# Patient Record
Sex: MALE | Race: WHITE | Employment: FULL TIME | URBAN - METROPOLITAN AREA
[De-identification: names, ages, dates, MRNs, and addresses within clinical notes are randomized per-mention and may not be internally consistent; named-entity substitution may affect disease eponyms.]

---

## 2022-03-23 ENCOUNTER — APPOINTMENT (OUTPATIENT)
Dept: GENERAL RADIOLOGY | Age: 22
End: 2022-03-23
Attending: EMERGENCY MEDICINE
Payer: COMMERCIAL

## 2022-03-23 ENCOUNTER — HOSPITAL ENCOUNTER (EMERGENCY)
Age: 22
Discharge: HOME | End: 2022-03-23
Attending: EMERGENCY MEDICINE
Payer: COMMERCIAL

## 2022-03-23 VITALS
HEART RATE: 98 BPM | DIASTOLIC BLOOD PRESSURE: 74 MMHG | HEIGHT: 69 IN | OXYGEN SATURATION: 99 % | WEIGHT: 165 LBS | BODY MASS INDEX: 24.44 KG/M2 | RESPIRATION RATE: 18 BRPM | SYSTOLIC BLOOD PRESSURE: 152 MMHG | TEMPERATURE: 98.4 F

## 2022-03-23 DIAGNOSIS — S50.851A FOREIGN BODY IN RIGHT FOREARM, INITIAL ENCOUNTER: Primary | ICD-10-CM

## 2022-03-23 DIAGNOSIS — F11.90 HEROIN USE: ICD-10-CM

## 2022-03-23 PROCEDURE — 99284 EMERGENCY DEPT VISIT MOD MDM: CPT

## 2022-03-23 PROCEDURE — 73090 X-RAY EXAM OF FOREARM: CPT

## 2022-03-23 PROCEDURE — 74011250637 HC RX REV CODE- 250/637: Performed by: PHYSICIAN ASSISTANT

## 2022-03-23 PROCEDURE — 90714 TD VACC NO PRESV 7 YRS+ IM: CPT | Performed by: PHYSICIAN ASSISTANT

## 2022-03-23 PROCEDURE — 74011250636 HC RX REV CODE- 250/636: Performed by: PHYSICIAN ASSISTANT

## 2022-03-23 PROCEDURE — 90471 IMMUNIZATION ADMIN: CPT

## 2022-03-23 RX ORDER — DOXYCYCLINE HYCLATE 100 MG
100 TABLET ORAL 2 TIMES DAILY
Qty: 14 TABLET | Refills: 0 | Status: SHIPPED | OUTPATIENT
Start: 2022-03-23 | End: 2022-03-30

## 2022-03-23 RX ORDER — DOXYCYCLINE 100 MG/1
100 CAPSULE ORAL
Status: COMPLETED | OUTPATIENT
Start: 2022-03-23 | End: 2022-03-23

## 2022-03-23 RX ADMIN — TETANUS AND DIPHTHERIA TOXOIDS ADSORBED 0.5 ML: 2; 2 INJECTION INTRAMUSCULAR at 17:41

## 2022-03-23 RX ADMIN — DOXYCYCLINE HYCLATE 100 MG: 100 CAPSULE ORAL at 17:41

## 2022-03-23 NOTE — ED TRIAGE NOTES
Patient states that he was injecting drugs into his arm when he broke off the needle into his arm. Patient states that he went to the hospital last night but left prior to provider evaluation.

## 2022-03-23 NOTE — ED NOTES
I have reviewed discharge instructions with the mom The patient verbalized understanding. Patient left ED via Discharge Method: ambulatory to home    Opportunity for questions and clarification provided. Patient given 1} scripts. To continue your aftercare when you leave the hospital, you may receive an automated call from our care team to check in on how you are doing. This is a free service and part of our promise to provide the best care and service to meet your aftercare needs.  If you have questions, or wish to unsubscribe from this service please call [phone]. Thank you for Choosing our [facility] Emergency Department.

## 2022-03-23 NOTE — DISCHARGE INSTRUCTIONS
Please call [phone] tomorrow morning to schedule follow-up appointment tomorrow with Dr.Yi Perdue [place]   [phone]   They have multiple resources to help you. Please call.  [email or web address]     Other local resources that are available are: The [address] [facility]. org for inpatient and outpatient substance abuse issues. [facility] [phone]  Medication assisted treatment    [address] [place] [address]  [phone]     Suicide Hotline   9-675-VIWAQWB     Narcotics Anonymous   www.na. org  Alcoholics Anonymous  www.aa.org

## 2022-03-23 NOTE — ED NOTES
First encounter with pt assumed care.  Patient sitting in chair, upright with family member at bedside

## 2022-03-23 NOTE — ED PROVIDER NOTES
Patient presents to the emergency department due to concern over a broken needle. Patient states yesterday around lunchtime he injected heroin in his right forearm and believes part of the needle broke off. Patient denies any fever or chills or any significant redness or swelling around the area of injection. He does have a very large superficial abrasion down the forearm and does not believe his tetanus is up-to-date and will be given one here. History reviewed. No pertinent past medical history. No past surgical history on file. History reviewed. No pertinent family history. Social History     Socioeconomic History    Marital status: SINGLE     Spouse name: Not on file    Number of children: Not on file    Years of education: Not on file    Highest education level: Not on file   Occupational History    Not on file   Tobacco Use    Smoking status: Not on file    Smokeless tobacco: Not on file   Substance and Sexual Activity    Alcohol use: Not on file    Drug use: Not on file    Sexual activity: Not on file   Other Topics Concern    Not on file   Social History Narrative    Not on file     Social Determinants of Health     Financial Resource Strain:     Difficulty of Paying Living Expenses: Not on file   Food Insecurity:     Worried About Running Out of Food in the Last Year: Not on file    Jermain of Food in the Last Year: Not on file   Transportation Needs:     Lack of Transportation (Medical): Not on file    Lack of Transportation (Non-Medical):  Not on file   Physical Activity:     Days of Exercise per Week: Not on file    Minutes of Exercise per Session: Not on file   Stress:     Feeling of Stress : Not on file   Social Connections:     Frequency of Communication with Friends and Family: Not on file    Frequency of Social Gatherings with Friends and Family: Not on file    Attends Mosque Services: Not on file    Active Member of Clubs or Organizations: Not on file    Attends Club or Organization Meetings: Not on file    Marital Status: Not on file   Intimate Partner Violence:     Fear of Current or Ex-Partner: Not on file    Emotionally Abused: Not on file    Physically Abused: Not on file    Sexually Abused: Not on file   Housing Stability:     Unable to Pay for Housing in the Last Year: Not on file    Number of [place] in the Last Year: Not on file    Unstable Housing in the Last Year: Not on file         ALLERGIES: Patient has no known allergies. Review of Systems   Constitutional: Negative for fever. Musculoskeletal:        Potential embedded needle in the right forearm   All other systems reviewed and are negative. Vitals:    03/23/22 1527   BP: (!) 154/79   Pulse: 97   Resp: 18   Temp: 98.4 °F (36.9 °C)   SpO2: 100%   Weight: 74.8 kg (165 lb)   Height: 5' 9\" (1.753 m)            Physical Exam  Vitals and nursing note reviewed. Constitutional:       Appearance: Normal appearance. HENT:      Head: Normocephalic and atraumatic. Nose: Nose normal.      Mouth/Throat:      Mouth: Mucous membranes are moist.   Eyes:      Extraocular Movements: Extraocular movements intact. Pupils: Pupils are equal, round, and reactive to light. Cardiovascular:      Rate and Rhythm: Normal rate and regular rhythm. Pulses: Normal pulses. Heart sounds: Normal heart sounds. Pulmonary:      Effort: Pulmonary effort is normal.      Breath sounds: Normal breath sounds. Musculoskeletal:         General: Normal range of motion. Cervical back: Normal range of motion and neck supple. Comments: Patient has a large superficial abrasion down the dorsal aspect of the right forearm. There is no palpable foreign body. Skin:     General: Skin is warm and dry. Capillary Refill: Capillary refill takes less than 2 seconds.       Comments: Abrasion to the right forearm and puncture wound to the dorsal aspect of the forearm   Neurological: General: No focal deficit present. Mental Status: He is alert. Psychiatric:         Mood and Affect: Mood normal.         Behavior: Behavior normal.         Thought Content: Thought content normal.          MDM  Number of Diagnoses or Management Options  Foreign body in right forearm, initial encounter  Heroin use  Diagnosis management comments: Differential diagnoses: Retained foreign body, puncture wound of forearm    Patient's forearm x-ray does show a needle within the soft tissues of the right forearm. I discussed the case with ER attending Dr. Kinga Mcbride who was in agreement that due to the depth it would be better for us to cover the patient with antibiotics and consult general surgery. I then spoke to Carmelo Moe from male nurse practitioner through [facility] who states that the patient can call their office tomorrow morning and follow-up with Dr. Alexandru Thomas.  I do not feel that emergently the needle needs to be removed tonight as there is currently no sign of infection and general surgery may decide to leave it alone if it does not become infected.   Pt was given a tetanus shot here and cover with doxycycline to help prevent infection       Amount and/or Complexity of Data Reviewed  Tests in the radiology section of CPT®: reviewed    Risk of Complications, Morbidity, and/or Mortality  Presenting problems: low  Diagnostic procedures: low  Management options: low    Patient Progress  Patient progress: improved         Procedures

## 2023-03-01 ENCOUNTER — APPOINTMENT (OUTPATIENT)
Dept: GENERAL RADIOLOGY | Age: 23
End: 2023-03-01
Payer: COMMERCIAL

## 2023-03-01 ENCOUNTER — HOSPITAL ENCOUNTER (EMERGENCY)
Age: 23
Discharge: HOME | End: 2023-03-01
Attending: EMERGENCY MEDICINE
Payer: COMMERCIAL

## 2023-03-01 VITALS
HEART RATE: 108 BPM | WEIGHT: 180 LBS | OXYGEN SATURATION: 93 % | HEIGHT: 69 IN | DIASTOLIC BLOOD PRESSURE: 75 MMHG | RESPIRATION RATE: 20 BRPM | SYSTOLIC BLOOD PRESSURE: 148 MMHG | TEMPERATURE: 100.7 F | BODY MASS INDEX: 26.66 KG/M2

## 2023-03-01 DIAGNOSIS — B19.20 HEPATITIS C VIRUS INFECTION WITHOUT HEPATIC COMA, UNSPECIFIED CHRONICITY: ICD-10-CM

## 2023-03-01 DIAGNOSIS — R60.9 PERIPHERAL EDEMA: Primary | ICD-10-CM

## 2023-03-01 DIAGNOSIS — J02.9 ACUTE PHARYNGITIS, UNSPECIFIED ETIOLOGY: ICD-10-CM

## 2023-03-01 LAB
ALBUMIN SERPL-MCNC: 3.5 G/DL (ref 3.5–5)
ALBUMIN/GLOB SERPL: 0.6 (ref 0.4–1.6)
ALP SERPL-CCNC: 105 U/L (ref 50–136)
ALT SERPL-CCNC: 17 U/L (ref 12–65)
ANION GAP SERPL CALC-SCNC: 5 MMOL/L (ref 2–11)
AST SERPL-CCNC: 12 U/L (ref 15–37)
BASOPHILS # BLD: 0 K/UL (ref 0–0.2)
BASOPHILS NFR BLD: 0 % (ref 0–2)
BILIRUB SERPL-MCNC: 0.3 MG/DL (ref 0.2–1.1)
BUN SERPL-MCNC: 7 MG/DL (ref 6–23)
CALCIUM SERPL-MCNC: 9.6 MG/DL (ref 8.3–10.4)
CHLORIDE SERPL-SCNC: 103 MMOL/L (ref 101–110)
CO2 SERPL-SCNC: 28 MMOL/L (ref 21–32)
CREAT SERPL-MCNC: 0.9 MG/DL (ref 0.8–1.5)
DIFFERENTIAL METHOD BLD: ABNORMAL
EOSINOPHIL # BLD: 0.1 K/UL (ref 0–0.8)
EOSINOPHIL NFR BLD: 1 % (ref 0.5–7.8)
ERYTHROCYTE [DISTWIDTH] IN BLOOD BY AUTOMATED COUNT: 13.8 % (ref 11.9–14.6)
GLOBULIN SER CALC-MCNC: 5.6 G/DL (ref 2.8–4.5)
GLUCOSE SERPL-MCNC: 121 MG/DL (ref 65–100)
HAV IGM SER QL: NONREACTIVE
HBV CORE IGM SER QL: NONREACTIVE
HBV SURFACE AG SER QL: NONREACTIVE
HCT VFR BLD AUTO: 42.8 % (ref 41.1–50.3)
HCV AB SER QL: REACTIVE
HETEROPH AB BLD QL IA: NEGATIVE
HGB BLD-MCNC: 14.3 G/DL (ref 13.6–17.2)
IMM GRANULOCYTES # BLD AUTO: 0.1 K/UL (ref 0–0.5)
IMM GRANULOCYTES NFR BLD AUTO: 0 % (ref 0–5)
INR PPP: 1
LYMPHOCYTES # BLD: 1.4 K/UL (ref 0.5–4.6)
LYMPHOCYTES NFR BLD: 8 % (ref 13–44)
MCH RBC QN AUTO: 29.2 PG (ref 26.1–32.9)
MCHC RBC AUTO-ENTMCNC: 33.4 G/DL (ref 31.4–35)
MCV RBC AUTO: 87.5 FL (ref 82–102)
MONOCYTES # BLD: 1.1 K/UL (ref 0.1–1.3)
MONOCYTES NFR BLD: 7 % (ref 4–12)
NEUTS SEG # BLD: 13.7 K/UL (ref 1.7–8.2)
NEUTS SEG NFR BLD: 84 % (ref 43–78)
NRBC # BLD: 0 K/UL (ref 0–0.2)
NT PRO BNP: 560 PG/ML (ref 5–125)
PLATELET # BLD AUTO: 389 K/UL (ref 150–450)
PMV BLD AUTO: 9.3 FL (ref 9.4–12.3)
POTASSIUM SERPL-SCNC: 3.6 MMOL/L (ref 3.5–5.1)
PROT SERPL-MCNC: 9.1 G/DL (ref 6.3–8.2)
PROTHROMBIN TIME: 13.8 SEC (ref 12.6–14.3)
RBC # BLD AUTO: 4.89 M/UL (ref 4.23–5.6)
SARS-COV-2 RDRP RESP QL NAA+PROBE: NOT DETECTED
SODIUM SERPL-SCNC: 136 MMOL/L (ref 133–143)
SOURCE: NORMAL
STREP, MOLECULAR: NOT DETECTED
WBC # BLD AUTO: 16.4 K/UL (ref 4.3–11.1)

## 2023-03-01 PROCEDURE — 87040 BLOOD CULTURE FOR BACTERIA: CPT

## 2023-03-01 PROCEDURE — 87070 CULTURE OTHR SPECIMN AEROBIC: CPT

## 2023-03-01 PROCEDURE — 85025 COMPLETE CBC W/AUTO DIFF WBC: CPT

## 2023-03-01 PROCEDURE — 71045 X-RAY EXAM CHEST 1 VIEW: CPT

## 2023-03-01 PROCEDURE — 87651 STREP A DNA AMP PROBE: CPT

## 2023-03-01 PROCEDURE — 85610 PROTHROMBIN TIME: CPT

## 2023-03-01 PROCEDURE — 2580000003 HC RX 258: Performed by: EMERGENCY MEDICINE

## 2023-03-01 PROCEDURE — 87635 SARS-COV-2 COVID-19 AMP PRB: CPT

## 2023-03-01 PROCEDURE — 6370000000 HC RX 637 (ALT 250 FOR IP): Performed by: EMERGENCY MEDICINE

## 2023-03-01 PROCEDURE — 99284 EMERGENCY DEPT VISIT MOD MDM: CPT

## 2023-03-01 PROCEDURE — 83880 ASSAY OF NATRIURETIC PEPTIDE: CPT

## 2023-03-01 PROCEDURE — 86308 HETEROPHILE ANTIBODY SCREEN: CPT

## 2023-03-01 PROCEDURE — 80074 ACUTE HEPATITIS PANEL: CPT

## 2023-03-01 PROCEDURE — 80053 COMPREHEN METABOLIC PANEL: CPT

## 2023-03-01 RX ORDER — AMOXICILLIN AND CLAVULANATE POTASSIUM 875; 125 MG/1; MG/1
1 TABLET, FILM COATED ORAL
Status: COMPLETED | OUTPATIENT
Start: 2023-03-01 | End: 2023-03-01

## 2023-03-01 RX ORDER — IBUPROFEN 600 MG/1
600 TABLET ORAL ONCE
Status: COMPLETED | OUTPATIENT
Start: 2023-03-01 | End: 2023-03-01

## 2023-03-01 RX ORDER — ACETAMINOPHEN 325 MG/1
650 TABLET ORAL
Status: COMPLETED | OUTPATIENT
Start: 2023-03-01 | End: 2023-03-01

## 2023-03-01 RX ORDER — FUROSEMIDE 20 MG/1
20 TABLET ORAL 2 TIMES DAILY
Qty: 60 TABLET | Refills: 0 | Status: SHIPPED | OUTPATIENT
Start: 2023-03-01

## 2023-03-01 RX ORDER — 0.9 % SODIUM CHLORIDE 0.9 %
1000 INTRAVENOUS SOLUTION INTRAVENOUS ONCE
Status: COMPLETED | OUTPATIENT
Start: 2023-03-01 | End: 2023-03-01

## 2023-03-01 RX ORDER — AMOXICILLIN AND CLAVULANATE POTASSIUM 875; 125 MG/1; MG/1
1 TABLET, FILM COATED ORAL DAILY
Qty: 7 TABLET | Refills: 0 | Status: SHIPPED | OUTPATIENT
Start: 2023-03-01 | End: 2023-03-08

## 2023-03-01 RX ADMIN — IBUPROFEN 600 MG: 600 TABLET, FILM COATED ORAL at 18:41

## 2023-03-01 RX ADMIN — SODIUM CHLORIDE 1000 ML: 9 INJECTION, SOLUTION INTRAVENOUS at 18:08

## 2023-03-01 RX ADMIN — ACETAMINOPHEN 650 MG: 325 TABLET ORAL at 21:14

## 2023-03-01 RX ADMIN — AMOXICILLIN AND CLAVULANATE POTASSIUM 1 TABLET: 875; 125 TABLET, FILM COATED ORAL at 21:14

## 2023-03-01 ASSESSMENT — PAIN SCALES - GENERAL: PAINLEVEL_OUTOF10: 0

## 2023-03-01 ASSESSMENT — ENCOUNTER SYMPTOMS
RESPIRATORY NEGATIVE: 1
EYES NEGATIVE: 1
GASTROINTESTINAL NEGATIVE: 1

## 2023-03-01 ASSESSMENT — PAIN - FUNCTIONAL ASSESSMENT: PAIN_FUNCTIONAL_ASSESSMENT: 0-10

## 2023-03-01 NOTE — ED PROVIDER NOTES
Emergency Department Provider Note                   PCP:                None None               Age: 25 y.o. Sex: male     DISPOSITION Decision To Discharge 03/01/2023 09:17:42 PM       ICD-10-CM    1. Peripheral edema  R60.9 [address]      2. Acute pharyngitis, unspecified etiology  J02.9       3. Hepatitis C virus infection without hepatic coma, unspecified chronicity  B19.20 External Referral To Infectious Disease          MEDICAL DECISION MAKING  Complexity of Problems Addressed:  1 acute illness that poses a threat to life or bodily function. Data Reviewed and Analyzed:  Category 1:     I ordered each unique test.  I reviewed the results of each unique test.        Category 2:     I independently ordered and reviewed the X-rays. Category 3: Discussion of management or test interpretation. Patient had a recent diagnosis of hepatitis C. I repeated the viral panel and once again it was positive for hepatitis C. I did send a referral to the infectious disease group. Discussed importance of following up with them and primary care. He also had complaint of lower extremity swelling. He has no shortness of breath. Chest x-ray is clear. He had minimal swelling however his BNP is slightly elevated at 590. I will write prescription for 20 mg of Lasix for 7 days. Patient was tachycardic upon arrival.  His physical exam did show purulent posterior oropharynx without edema. The patient has no throat pain. I did do a monotest, strep test, COVID test all were negative. I sent a throat culture. Patient received a liter of fluid and some ibuprofen and said he felt significantly better however he had a temperature of 100.7 and heart rate of 108 at the time of reevaluation.   I discussed that the patient should have improved after ibuprofen and fluids and that I recommended he stay in the hospital.  The patient is adamant he is not going to stay in the hospital.  We did discuss that he could get worse. The patient still wants to go home. He will receive Augmentin in the emergency department for unknown throat infection. A throat culture is still pending. Patient looks nontoxic at the time of discharge however did recommend he stay but he consistently said he wants to go home. Patient received prescription for the Lasix and the Augmentin. Patient's blood work showed normal LFTs. He did have a leukocytosis of 16 that is likely attributed to the posterior oropharynx. No other acute abnormalities. Patient is here with his mother who is sure is that he will go to the primary care physician. Risk of Complications and/or Morbidity of Patient Management:  OTC drug management completed, Prescription drug management completed, and Patient was discharged risks and benefits of hospitalization were considered     Tesfaye Cornejo is a 25 y.o. male who presents to the Emergency Department with chief complaint of    Chief Complaint   Patient presents with    Fatigue    Nausea     Vomit    Emesis    Leg Swelling      61-year-old male with a history of untreated hepatitis C presenting with lower extremity swelling. He also endorses some white spots in his throat but has no sensation of throat swelling or throat pain. He has had it there for 4 weeks. He has had a low-grade fever. Denies any shortness of breath or abdominal pain. Denies any change in urination or bowel habits       Review of Systems   HENT: Negative. Eyes: Negative. Respiratory: Negative. Cardiovascular:  Positive for leg swelling. Gastrointestinal: Negative. Genitourinary: Negative. Musculoskeletal: Negative. Skin: Negative. Neurological: Negative. Psychiatric/Behavioral: Negative. Vitals signs and nursing note reviewed.    Patient Vitals for the past 4 hrs:   Temp Pulse BP SpO2   03/01/23 2100 (!) 100.7 °F (38.2 °C) (!) 108 (!) 148/75 93 %          Physical Exam  Constitutional: General: He is not in acute distress. Appearance: Normal appearance. He is not ill-appearing. HENT:      Head: Normocephalic and atraumatic. Nose: No rhinorrhea. Mouth/Throat:      Mouth: Mucous membranes are moist.      Pharynx: Oropharyngeal exudate present. No pharyngeal swelling, posterior oropharyngeal erythema or uvula swelling. Eyes:      Extraocular Movements: Extraocular movements intact. Cardiovascular:      Rate and Rhythm: Normal rate and regular rhythm. Pulmonary:      Effort: Pulmonary effort is normal.      Breath sounds: Normal breath sounds. Abdominal:      General: Abdomen is flat. Bowel sounds are normal. There is no distension. Palpations: Abdomen is soft. Tenderness: There is no abdominal tenderness. Musculoskeletal:         General: Normal range of motion. Cervical back: Normal range of motion. Right lower leg: Edema present. Left lower leg: Edema present. Skin:     General: Skin is warm and dry. Neurological:      General: No focal deficit present. Mental Status: He is alert.    Psychiatric:         Mood and Affect: Mood normal.        Procedures          Orders Placed This Encounter   Procedures    COVID-19, Rapid    Group A Strep Screen By PCR    Culture, Blood 1    Culture, Throat    Blood Culture 1    Blood Culture 2    XR CHEST PORTABLE    CBC with Auto Differential    CMP    Mononucleosis Screen    Hepatitis Panel, Acute    Protime-INR    Brain Natriuretic Peptide    External Referral To Infectious Disease    [facility] [place]        Medications   0.9 % sodium chloride bolus (0 mLs IntraVENous Stopped 3/1/23 2032)   ibuprofen (ADVIL;MOTRIN) tablet 600 mg (600 mg Oral Given 3/1/23 1841)   amoxicillin-clavulanate (AUGMENTIN) 875-125 MG per tablet 1 tablet (1 tablet Oral Given 3/1/23 2114)   acetaminophen (TYLENOL) tablet 650 mg (650 mg Oral Given 3/1/23 2114)       New Prescriptions    AMOXICILLIN-CLAVULANATE (AUGMENTIN) 875-125 MG PER TABLET    Take 1 tablet by mouth daily for 7 days    FUROSEMIDE (LASIX) 20 MG TABLET    Take 1 tablet by mouth 2 times daily        No past medical history on file. No past surgical history on file. No family history on file. Social History     Socioeconomic History    Marital status: Single        Allergies: Patient has no known allergies. Previous Medications    No medications on file        Results for orders placed or performed during the hospital encounter of 03/01/23   COVID-19, Rapid    Specimen: Nasopharyngeal   Result Value Ref Range    Source Nasopharyngeal      SARS-CoV-2, Rapid Not detected NOTD     Group A Strep Screen By PCR    Specimen: Swab   Result Value Ref Range    Strep, Molecular Not detected NOTD     XR CHEST PORTABLE    Narrative    EXAMINATION: XR CHEST PORTABLE      DATE OF EXAM: 3/1/2023 5:30 PM    HISTORY: short of breath     COMPARISON: None. FINDINGS:    The cardiomediastinal silhouette is within normal limits. The pulmonary   vasculature is nondistended. No focal pulmonary consolidation, sizable effusion   or pneumothorax. Bony chest is grossly unremarkable for technique. Impression    1. No acute pulmonary process. Thank you for the referral of this patient. This exam was interpreted by an   [employer] certified diagnostic radiologist with   fellowship training. If there are any questions regarding this exam please feel   free to contact us directly at [phone].     [address] [facility], .O.   3/1/2023 6:06:00 PM   CBC with Auto Differential   Result Value Ref Range    WBC 16.4 (H) 4.3 - 11.1 K/uL    RBC 4.89 4.23 - 5.6 M/uL    Hemoglobin 14.3 13.6 - 17.2 g/dL    Hematocrit 42.8 41.1 - 50.3 %    MCV 87.5 82 - 102 FL    MCH 29.2 26.1 - 32.9 PG    MCHC 33.4 31.4 - 35.0 g/dL    RDW 13.8 11.9 - 14.6 %    Platelets 939 622 - 709 K/uL    MPV 9.3 (L) 9.4 - 12.3 FL    nRBC 0.00 0.0 - 0.2 K/uL Differential Type AUTOMATED      Seg Neutrophils 84 (H) 43 - 78 %    Lymphocytes 8 (L) 13 - 44 %    Monocytes 7 4.0 - 12.0 %    Eosinophils % 1 0.5 - 7.8 %    Basophils 0 0.0 - 2.0 %    Immature Granulocytes 0 0.0 - 5.0 %    Segs Absolute 13.7 (H) 1.7 - 8.2 K/UL    Absolute Lymph # 1.4 0.5 - 4.6 K/UL    Absolute Mono # 1.1 0.1 - 1.3 K/UL    Absolute Eos # 0.1 0.0 - 0.8 K/UL    Basophils Absolute 0.0 0.0 - 0.2 K/UL    Absolute Immature Granulocyte 0.1 0.0 - 0.5 K/UL   CMP   Result Value Ref Range    Sodium 136 133 - 143 mmol/L    Potassium 3.6 3.5 - 5.1 mmol/L    Chloride 103 101 - 110 mmol/L    CO2 28 21 - 32 mmol/L    Anion Gap 5 2 - 11 mmol/L    Glucose 121 (H) 65 - 100 mg/dL    BUN 7 6 - 23 MG/DL    Creatinine 0.90 0.8 - 1.5 MG/DL    Est, Glom Filt Rate >60 >60 ml/min/1.73m2    Calcium 9.6 8.3 - 10.4 MG/DL    Total Bilirubin 0.3 0.2 - 1.1 MG/DL    ALT 17 12 - 65 U/L    AST 12 (L) 15 - 37 U/L    Alk Phosphatase 105 50 - 136 U/L    Total Protein 9.1 (H) 6.3 - 8.2 g/dL    Albumin 3.5 3.5 - 5.0 g/dL    Globulin 5.6 (H) 2.8 - 4.5 g/dL    Albumin/Globulin Ratio 0.6 0.4 - 1.6     Mononucleosis Screen   Result Value Ref Range    Mononucleosis Screen Negative NEG     Hepatitis Panel, Acute   Result Value Ref Range    Hep A IgM NONREACTIVE NR      Hep B Core Ab, IgM NONREACTIVE NR      Hepatitis B Surface Ag NONREACTIVE NR      Hepatitis C Ab REACTIVE (A) NR     Protime-INR   Result Value Ref Range    Protime 13.8 12.6 - 14.3 sec    INR 1.0     Brain Natriuretic Peptide   Result Value Ref Range    NT Pro- (H) 5 - 125 PG/ML        XR CHEST PORTABLE   Final Result      1. No acute pulmonary process. Thank you for the referral of this patient. This exam was interpreted by an    [employer] certified diagnostic radiologist with    fellowship training. If there are any questions regarding this exam please feel    free to contact us directly at [phone].       [address] Paolo Jones D.O.    3/1/2023 6:06:00 PM                        Voice dictation software was used during the making of this note. This software is not perfect and grammatical and other typographical errors may be present. This note has not been completely proofread for errors.      Raina Cast MD  03/01/23 2124

## 2023-03-01 NOTE — ED TRIAGE NOTES
Hx : hep C   Since last night having body aches, had one episodes on emesis, weakness, reports white patches to throat(x3 months), fever, runny nose, congestion. Also reports feet swelling.  Denies diarrhea     Pt has large white patches to back of throat, denies sore throat, denies painful swallowing

## 2023-03-01 NOTE — Clinical Note
Guillermina Cartagena was seen and treated in our emergency department on 3/1/2023. He may return to work on 03/03/2023. If you have any questions or concerns, please don't hesitate to call.       Ynes Schneider MD

## 2023-03-02 NOTE — DISCHARGE INSTRUCTIONS
Take Augmentin twice a day for 10 to 14 days.  Alternate Tylenol and 6 hours later ibuprofen and 6 hours later Tylenol for the next 3 days.  Take 650 mg of Tylenol within 6 hours later 600 mg ibuprofen.  Stay well-hydrated.  You do have signs of lower extremity edema I am writing 7 days of Lasix which will make you urinate more.  If at any point your symptoms worsen you need to return to the emergency department immediately.  This includes altered mental status, worsening fever while taking Tylenol with ibuprofen, fast heart rate, low blood pressure, any other concerning signs or symptoms.  Is also very important you follow-up with your primary care, infectious disease and cardiology.

## 2023-03-02 NOTE — ED NOTES
I have reviewed discharge instructions with the patient.  The patient verbalized understanding.    Patient left ED via Discharge Method: ambulatory to Home with (Mom).    Opportunity for questions and clarification provided.       Patient given 2 scripts.         To continue your aftercare when you leave the hospital, you may receive an automated call from our care team to check in on how you are doing.  This is a free service and part of our promise to provide the best care and service to meet your aftercare needs.” If you have questions, or wish to unsubscribe from this service please call [phone].  Thank you for Choosing our [facility] Emergency Department.        Noe Mai RN  03/01/23 2531

## 2023-03-04 LAB
BACTERIA SPEC CULT: ABNORMAL
BACTERIA SPEC CULT: ABNORMAL
SERVICE CMNT-IMP: ABNORMAL

## 2023-03-05 LAB
BACTERIA SPEC CULT: NORMAL
SERVICE CMNT-IMP: NORMAL